# Patient Record
Sex: MALE | Race: WHITE | NOT HISPANIC OR LATINO | URBAN - METROPOLITAN AREA
[De-identification: names, ages, dates, MRNs, and addresses within clinical notes are randomized per-mention and may not be internally consistent; named-entity substitution may affect disease eponyms.]

---

## 2017-06-12 ENCOUNTER — EMERGENCY (EMERGENCY)
Facility: HOSPITAL | Age: 37
LOS: 1 days | Discharge: HOME | End: 2017-06-12
Attending: EMERGENCY MEDICINE
Payer: COMMERCIAL

## 2017-06-12 VITALS
RESPIRATION RATE: 20 BRPM | HEART RATE: 71 BPM | SYSTOLIC BLOOD PRESSURE: 122 MMHG | WEIGHT: 179.9 LBS | DIASTOLIC BLOOD PRESSURE: 75 MMHG | HEIGHT: 72 IN | TEMPERATURE: 99 F | OXYGEN SATURATION: 96 %

## 2017-06-12 PROCEDURE — 72110 X-RAY EXAM L-2 SPINE 4/>VWS: CPT | Mod: 26

## 2017-06-12 PROCEDURE — 99283 EMERGENCY DEPT VISIT LOW MDM: CPT | Mod: 25

## 2017-06-12 PROCEDURE — 72110 X-RAY EXAM L-2 SPINE 4/>VWS: CPT

## 2017-06-12 PROCEDURE — 99283 EMERGENCY DEPT VISIT LOW MDM: CPT

## 2017-06-12 RX ORDER — OXYCODONE HYDROCHLORIDE 5 MG/1
1 TABLET ORAL
Qty: 12 | Refills: 0 | OUTPATIENT
Start: 2017-06-12 | End: 2017-06-14

## 2017-06-12 RX ADMIN — Medication 60 MILLIGRAM(S): at 13:30

## 2017-06-12 NOTE — ED ADULT TRIAGE NOTE - CHIEF COMPLAINT QUOTE
Patient arrived to ED today with c/o left upper left pain that shoots down his left leg for the past three days.  Patient states that he lifted something and felt a pull to his left lower back.

## 2017-06-12 NOTE — ED STATDOCS - PLAN OF CARE
Apply ice or heat (your preference) to affected area for 15-20 minutes every few hours for the next few days.  Use as much or as frequently as desired. take prednisone as prescribed, then start naprosyn twice a day. Percocet as needed for severe pain. Avoid heavy lifting and strenuous activity until aches and pains are improved.  Return immediately to the ER for re-evaluation if your symptoms intensify or if need symptoms (not currently present) develop. Otherwise, follow-up with your doctor in 2-3 days for re-examination.

## 2017-06-12 NOTE — ED STATDOCS - ATTENDING CONTRIBUTION TO CARE
I, Abhilash Ha, performed the initial face to face bedside interview with this patient regarding history of present illness, review of symptoms and relevant past medical, social and family history.  I completed an independent physical examination.  I was the provider who initially evaluated this patient.  The history, relevant review of systems, past medical and surgical history, medical decision making, and physical examination was documented by the scribe in my presence and I attest to the accuracy of the documentation. Follow-up on ordered tests (ie labs, radiologic studies) and re-evaluation of the patient's status has been communicated to the ACP.  Disposition of the patient will be based on test outcome and response to ED interventions.

## 2017-06-12 NOTE — ED STATDOCS - PROGRESS NOTE DETAILS
xray reviewed. patient and wife informed of scoliosis.  Anticipatpry guidance and supportive care recommended Xrays negative for acute bony pathology-- resulted d/w patient by Dr. Ha. Pt stable for d/c.

## 2017-06-12 NOTE — ED STATDOCS - CARE PLAN
Principal Discharge DX:	Acute left-sided low back pain with left-sided sciatica  Instructions for follow-up, activity and diet:	Apply ice or heat (your preference) to affected area for 15-20 minutes every few hours for the next few days.  Use as much or as frequently as desired. take prednisone as prescribed, then start naprosyn twice a day. Percocet as needed for severe pain. Avoid heavy lifting and strenuous activity until aches and pains are improved.  Return immediately to the ER for re-evaluation if your symptoms intensify or if need symptoms (not currently present) develop. Otherwise, follow-up with your doctor in 2-3 days for re-examination.

## 2017-06-12 NOTE — ED STATDOCS - OBJECTIVE STATEMENT
37 year old male, with hx of asthma, presenting to the ED complaining of left lower back pain radiating down to his foot and numbness to his left toes. Pt states that his pain begins in his left buttock and radiates down his left lower extremity. He states that he felt "something in his lower back" while at work and began to feel pain afterwards. Pt states that he was given Cyclobenzaprine and Tramadol with no relief. He denies having any difficulty urinating, difficulty moving his bowels, or urinary incontinence. Pt states that he is a smoker. He states that he has NKDA. No further complaints at this time.

## 2017-06-12 NOTE — ED STATDOCS - MEDICAL DECISION MAKING DETAILS
Back pain with localizing sensory abnormalities. Will give prednisone, NSAIDs, and have pt follow up with orthopedic spine as outpatient. Back pain with localizing sensory abnormalities. Recommend prednisone, NSAIDs, and follow-up with orthopedic spine as outpatient.

## 2017-06-12 NOTE — ED STATDOCS - NS ED MD SCRIBE ATTENDING SCRIBE SECTIONS
PHYSICAL EXAM/VITAL SIGNS( Pullset)/REVIEW OF SYSTEMS/HISTORY OF PRESENT ILLNESS/PAST MEDICAL/SURGICAL/SOCIAL HISTORY/INTAKE ASSESSMENT/SCREENINGS/DISPOSITION/HIV

## 2017-06-12 NOTE — ED STATDOCS - CARDIAC, MLM
normal rate, regular rhythm, and no murmur. 2+ femoral pulses bilaterally. 2+ DP pulse bilaterally. No pedal edema.

## 2017-06-14 ENCOUNTER — OTHER (OUTPATIENT)
Age: 37
End: 2017-06-14

## 2017-06-15 ENCOUNTER — APPOINTMENT (OUTPATIENT)
Dept: ORTHOPEDIC SURGERY | Facility: CLINIC | Age: 37
End: 2017-06-15

## 2017-06-15 VITALS
WEIGHT: 180 LBS | HEART RATE: 65 BPM | HEIGHT: 72 IN | BODY MASS INDEX: 24.38 KG/M2 | TEMPERATURE: 97.9 F | SYSTOLIC BLOOD PRESSURE: 135 MMHG | DIASTOLIC BLOOD PRESSURE: 80 MMHG

## 2017-06-15 DIAGNOSIS — Z87.09 PERSONAL HISTORY OF OTHER DISEASES OF THE RESPIRATORY SYSTEM: ICD-10-CM

## 2017-06-15 DIAGNOSIS — M60.9 MYOSITIS, UNSPECIFIED: ICD-10-CM

## 2017-06-15 DIAGNOSIS — M54.32 SCIATICA, LEFT SIDE: ICD-10-CM

## 2017-06-15 DIAGNOSIS — Z80.9 FAMILY HISTORY OF MALIGNANT NEOPLASM, UNSPECIFIED: ICD-10-CM

## 2017-06-15 DIAGNOSIS — Z78.9 OTHER SPECIFIED HEALTH STATUS: ICD-10-CM

## 2017-06-15 DIAGNOSIS — M54.16 RADICULOPATHY, LUMBAR REGION: ICD-10-CM

## 2017-06-15 DIAGNOSIS — F17.200 NICOTINE DEPENDENCE, UNSPECIFIED, UNCOMPLICATED: ICD-10-CM

## 2017-06-15 RX ORDER — METHYLPREDNISOLONE 4 MG/1
4 TABLET ORAL
Qty: 1 | Refills: 0 | Status: ACTIVE | COMMUNITY
Start: 2017-06-15 | End: 1900-01-01

## 2017-06-15 RX ORDER — NAPROXEN 500 MG/1
TABLET ORAL
Refills: 0 | Status: ACTIVE | COMMUNITY

## 2017-06-15 RX ORDER — PREDNISONE 5 MG/1
5 TABLET ORAL
Refills: 0 | Status: ACTIVE | COMMUNITY

## 2017-06-15 RX ORDER — CHLORHEXIDINE GLUCONATE, 0.12% ORAL RINSE 1.2 MG/ML
0.12 SOLUTION DENTAL
Qty: 473 | Refills: 0 | Status: ACTIVE | COMMUNITY
Start: 2017-04-04

## 2017-06-15 RX ORDER — OXYCODONE 5 MG/1
5 TABLET ORAL
Refills: 0 | Status: ACTIVE | COMMUNITY

## 2017-06-15 RX ORDER — KETOROLAC TROMETHAMINE 10 MG/1
10 TABLET, FILM COATED ORAL 3 TIMES DAILY
Qty: 15 | Refills: 0 | Status: ACTIVE | COMMUNITY
Start: 2017-06-15 | End: 1900-01-01

## 2017-06-20 ENCOUNTER — APPOINTMENT (OUTPATIENT)
Dept: MRI IMAGING | Facility: CLINIC | Age: 37
End: 2017-06-20

## 2017-11-13 ENCOUNTER — OTHER (OUTPATIENT)
Age: 37
End: 2017-11-13

## 2019-06-25 NOTE — ED STATDOCS - NEUROLOGICAL, MLM
Fall with Harm Risk L4-S1 motor and sensory 5x5 and equal bilaterally. Decreased sensation L4-S1 distribution on left.

## 2024-08-27 ENCOUNTER — NON-APPOINTMENT (OUTPATIENT)
Age: 44
End: 2024-08-27

## 2025-04-11 NOTE — ED STATDOCS - NS ED ATTENDING STATEMENT MOD
There is no order in Epic for Bone Density.  Please put in so I can release study.  Thnk you!  
I have personally performed a face to face diagnostic evaluation on this patient. I have reviewed the ACP note and agree with the history, exam and plan of care, except as noted.